# Patient Record
(demographics unavailable — no encounter records)

---

## 2024-11-11 NOTE — CONSULT LETTER
[Dear  ___] : Dear  [unfilled], [Consult Letter:] : I had the pleasure of evaluating your patient, [unfilled]. [Please see my note below.] : Please see my note below. [Consult Closing:] : Thank you very much for allowing me to participate in the care of this patient.  If you have any questions, please do not hesitate to contact me. [Sincerely,] : Sincerely, [FreeTextEntry3] : Veronica Bradley MD Division of Pediatric, General, Thoracic and Endoscopic Surgery [facility]

## 2024-11-11 NOTE — CONSULT LETTER
[Dear  ___] : Dear  [unfilled], [Consult Letter:] : I had the pleasure of evaluating your patient, [unfilled]. [Please see my note below.] : Please see my note below. [Consult Closing:] : Thank you very much for allowing me to participate in the care of this patient.  If you have any questions, please do not hesitate to contact me. [Sincerely,] : Sincerely, [FreeTextEntry3] : Veronica Bradley MD Division of Pediatric, General, Thoracic and Endoscopic Surgery [facility]

## 2024-11-11 NOTE — HISTORY OF PRESENT ILLNESS
[FreeTextEntry1] : Radha is a 10yo female presenting today for surgical evaluation of a tongue tie.  Her parents report that she has had some issues with speech.  This has been ongoing for approximately 4 years.  In particular they note that she has difficulty pronouncing R's when speaking Syriac.  She has been enrolled in speech therapy at school.  Her parents note that there has been improvement in her speech over the course of the last year.  There are no other associated issues with eating or swallowing.  PMHx:  None PSHx:  None Meds:  Vitamins NKDA Family: There is no family history of anesthetic related complications or bleeding disorders.

## 2024-11-11 NOTE — HISTORY OF PRESENT ILLNESS
[FreeTextEntry1] : Radha is a 10yo female presenting today for surgical evaluation of a tongue tie.  Her parents report that she has had some issues with speech.  This has been ongoing for approximately 4 years.  In particular they note that she has difficulty pronouncing R's when speaking Arabic.  She has been enrolled in speech therapy at school.  Her parents note that there has been improvement in her speech over the course of the last year.  There are no other associated issues with eating or swallowing.  PMHx:  None PSHx:  None Meds:  Vitamins NKDA Family: There is no family history of anesthetic related complications or bleeding disorders.

## 2024-11-11 NOTE — ASSESSMENT
[FreeTextEntry1] : Radha is a 9-year-old female who presents to the office for tongue-tie evaluation.  The concerns are related to difficulties with speech.  Her parents report there has been some improvement with speech therapy at school.  On exam there is mild tethering of the tongue by the sublingual frenulum.  We will plan to obtain the speech therapy records to better understand the extent of the patient's progress and underlying difficulties.  The nature of a tongue-tie release was briefly discussed with parents.  All of their questions were answered.  Will plan for follow-up via telephone once we have obtained records from school.

## 2024-11-11 NOTE — REASON FOR VISIT
[Initial - Scheduled] : an initial, scheduled visit with concerns of [Tongue tie] : tongue tie [Patient] : patient [Parents] : parents

## 2024-11-11 NOTE — PHYSICAL EXAM
[Acute Distress] : no acute distress [Alert] : alert [Toxic appearing] : well appearing [Normocephalic] : normocephalic [Icteric sclera] : no icteric sclera [FROM] : full range of motion [Normal Respiratory Effort] : normal respiratory efforts [Regular heart rate and rhythm] : regular heart rate and rhythm [Tender] : not tender [Distended] : not distended [Moves all extremities x4] : moves all extremities x4 [Grossly intact] : grossly intact [TextBox_13] : There is mild tethering of the tongue by the sublingual frenulum